# Patient Record
Sex: MALE | Race: WHITE | NOT HISPANIC OR LATINO | URBAN - METROPOLITAN AREA
[De-identification: names, ages, dates, MRNs, and addresses within clinical notes are randomized per-mention and may not be internally consistent; named-entity substitution may affect disease eponyms.]

---

## 2021-01-18 ENCOUNTER — TELEPHONE ENCOUNTER (OUTPATIENT)
Dept: URBAN - METROPOLITAN AREA CLINIC 35 | Facility: CLINIC | Age: 56
End: 2021-01-18

## 2021-01-19 ENCOUNTER — TELEPHONE ENCOUNTER (OUTPATIENT)
Dept: URBAN - METROPOLITAN AREA CLINIC 35 | Facility: CLINIC | Age: 56
End: 2021-01-19

## 2021-03-22 ENCOUNTER — OFFICE VISIT (OUTPATIENT)
Dept: URBAN - METROPOLITAN AREA CLINIC 33 | Facility: CLINIC | Age: 56
End: 2021-03-22

## 2021-03-22 ENCOUNTER — DASHBOARD ENCOUNTERS (OUTPATIENT)
Age: 56
End: 2021-03-22

## 2021-03-22 VITALS
TEMPERATURE: 98.2 F | HEART RATE: 72 BPM | BODY MASS INDEX: 40.43 KG/M2 | OXYGEN SATURATION: 95 % | SYSTOLIC BLOOD PRESSURE: 150 MMHG | HEIGHT: 74 IN | WEIGHT: 315 LBS | DIASTOLIC BLOOD PRESSURE: 100 MMHG

## 2021-03-22 RX ORDER — TAMSULOSIN HYDROCHLORIDE 0.4 MG/1
1 CAPSULE CAPSULE ORAL ONCE A DAY
Qty: 30 | Status: ACTIVE | COMMUNITY

## 2021-03-22 RX ORDER — FLECAINIDE ACETATE 50 MG/1
AS DIRECTED TABLET ORAL
Status: ACTIVE | COMMUNITY
Start: 2021-03-22

## 2021-03-22 RX ORDER — CHLORHEXIDINE GLUCONATE 4 %
AS DIRECTED LIQUID (ML) TOPICAL
Status: ACTIVE | COMMUNITY

## 2021-03-22 RX ORDER — FLUTICASONE FUROATE AND VILANTEROL TRIFENATATE 100; 25 UG/1; UG/1
INHALE 1 PUFF BY MOUTH ONCE DAILY POWDER RESPIRATORY (INHALATION)
Qty: 60 UNSPECIFIED | Refills: 6 | Status: ACTIVE | COMMUNITY

## 2021-03-22 RX ORDER — ATORVASTATIN CALCIUM 10 MG/1
1 TABLET TABLET, FILM COATED ORAL ONCE A DAY
Qty: 30 | Status: ACTIVE | COMMUNITY

## 2021-03-22 RX ORDER — SODIUM PICOSULFATE, MAGNESIUM OXIDE, AND ANHYDROUS CITRIC ACID 10; 3.5; 12 MG/160ML; G/160ML; G/160ML
AS DIRECTED LIQUID ORAL
Qty: 1 KIT | OUTPATIENT
Start: 2021-03-22

## 2021-03-22 RX ORDER — METOPROLOL TARTRATE 75 MG/1
TAKE 1 TABLET BY MOUTH TWICE DAILY WITH FOOD TABLET, FILM COATED ORAL
Qty: 60 UNSPECIFIED | Refills: 2 | Status: ACTIVE | COMMUNITY

## 2021-03-22 RX ORDER — NORTRIPTYLINE HYDROCHLORIDE 10 MG/1
TAKE 2 CAPSULES BY MOUTH ONCE DAILY CAPSULE ORAL
Qty: 60 UNSPECIFIED | Refills: 3 | Status: ACTIVE | COMMUNITY

## 2021-03-22 RX ORDER — ALUMINUM ZIRCONIUM TRICHLOROHYDREX GLY 0.19 G/G
1 TABLET 30 MINUTES BEFORE MORNING MEAL STICK TOPICAL ONCE A DAY
Qty: 30 | Status: ACTIVE | COMMUNITY
Start: 2021-03-22

## 2021-03-22 RX ORDER — MULTIVIT-MIN/IRON/FOLIC ACID/K 18-600-40
2 TABLETS CAPSULE ORAL
Status: ACTIVE | COMMUNITY

## 2021-03-22 RX ORDER — CALCIUM CITRATE/VITAMIN D3 200MG-6.25
AS DIRECTED TABLET ORAL
Status: ACTIVE | COMMUNITY
Start: 2021-03-22

## 2021-03-22 RX ORDER — BENAZEPRIL HYDROCHLORIDE 20 MG/1
TAKE 1 & 1 2 (ONE & ONE HALF) TABLETS BY MOUTH ONCE DAILY TABLET, COATED ORAL
Qty: 90 UNSPECIFIED | Refills: 2 | Status: ACTIVE | COMMUNITY

## 2021-03-22 RX ORDER — SEMAGLUTIDE 1.34 MG/ML
INJECT 0.5 MG ONCE A WEEK INJECTION, SOLUTION SUBCUTANEOUS
Qty: 2 MILLILITER | Refills: 5 | Status: ACTIVE | COMMUNITY

## 2021-03-22 RX ORDER — METFORMIN HYDROCHLORIDE 500 MG/1
1 TABLET WITH A MEAL TABLET, FILM COATED ORAL ONCE A DAY
Qty: 30 | Status: ACTIVE | COMMUNITY

## 2021-03-22 NOTE — HPI-MIGRATED HPI
;     Surveillance Colonoscopy : 54 y/o male presents today for a surveillance colonoscopy. He admits his last colonoscopy was completed 2015 revealing colon polyps, diverticulosis, and hemorrhoids. He denies a family hx of colon, gastric, or esophageal cancer/polyps.   Currently reports 1-2 BMs a day with occasional strain. His stools are soft and semi formed without blood or mucus present.   He admits break through episodes of heartburn that lasted for 1 hr. He admits that he has used Tums twice in addition to Prilosec OTC. He reports that the times that he has had to use Tums he was eating tomato based foods. ;

## 2021-03-22 NOTE — EXAM-MIGRATED EXAMINATIONS
GENERAL APPEARANCE: - alert, in no acute distress, well developed, well nourished;   HEAD: - normocephalic, atraumatic;   ORAL CAVITY: - mucosa moist;   NECK/THYROID: - neck supple, full range of motion, no cervical lymphadenopathy, no thyroid nodules, no thyromegaly, trachea midline;   HEART: - no murmurs, regular rate and rhythm, S1, S2 normal;   LUNGS: - clear to auscultation bilaterally, good air movement, no wheezes, rales, rhonchi;   ABDOMEN: - bowel sounds present, no masses palpable, no organomegaly , no rebound tenderness, soft, nontender, nondistended;

## 2021-03-24 ENCOUNTER — TELEPHONE ENCOUNTER (OUTPATIENT)
Dept: URBAN - METROPOLITAN AREA CLINIC 35 | Facility: CLINIC | Age: 56
End: 2021-03-24

## 2021-04-14 ENCOUNTER — OFFICE VISIT (OUTPATIENT)
Dept: URBAN - METROPOLITAN AREA MEDICAL CENTER 10 | Facility: MEDICAL CENTER | Age: 56
End: 2021-04-14

## 2021-04-27 ENCOUNTER — OFFICE VISIT (OUTPATIENT)
Dept: URBAN - METROPOLITAN AREA CLINIC 33 | Facility: CLINIC | Age: 56
End: 2021-04-27